# Patient Record
Sex: FEMALE | URBAN - METROPOLITAN AREA
[De-identification: names, ages, dates, MRNs, and addresses within clinical notes are randomized per-mention and may not be internally consistent; named-entity substitution may affect disease eponyms.]

---

## 2024-05-14 ENCOUNTER — APPOINTMENT (OUTPATIENT)
Dept: URBAN - METROPOLITAN AREA CLINIC 252 | Age: 39
Setting detail: DERMATOLOGY
End: 2024-05-14

## 2024-05-14 ENCOUNTER — APPOINTMENT (OUTPATIENT)
Dept: URBAN - METROPOLITAN AREA CLINIC 252 | Age: 39
Setting detail: DERMATOLOGY
End: 2024-05-15

## 2024-05-14 VITALS — WEIGHT: 135 LBS | HEIGHT: 65 IN

## 2024-05-14 DIAGNOSIS — L73.2 HIDRADENITIS SUPPURATIVA: ICD-10-CM

## 2024-05-14 PROCEDURE — 99202 OFFICE O/P NEW SF 15 MIN: CPT

## 2024-05-14 PROCEDURE — OTHER COUNSELING: OTHER

## 2024-05-14 ASSESSMENT — LOCATION SIMPLE DESCRIPTION DERM: LOCATION SIMPLE: GROIN

## 2024-05-14 ASSESSMENT — LOCATION ZONE DERM: LOCATION ZONE: TRUNK

## 2024-05-14 ASSESSMENT — LOCATION DETAILED DESCRIPTION DERM
LOCATION DETAILED: RIGHT INGUINAL CREASE
LOCATION DETAILED: LEFT INGUINAL CREASE

## 2024-05-14 NOTE — HPI: SKIN LESIONS
Is This A New Presentation, Or A Follow-Up?: Growths
Additional History: Has a history of hidradenitis suppurativa. No flare for 5 years, started with first pregnancy in 2002. Last preg was 2011. Only even been in groin creases. Used topical and oral antibiotic in past that were helpful in past. Her concern today is scarring. Never used spironolactone.

## 2024-05-14 NOTE — PROCEDURE: COUNSELING
Patient Specific Counseling (Will Not Stick From Patient To Patient): Discussed scarring is permanent unless surgically removed. May try lasers to help reduce pigmentation\\nIf pt flares advised pt to return for ilk injection. Discussed since pt hasn’t flared in five years maintenance antibiotic is not needed. Would consider maintenance spironolactone if pt flares regularly as pt flares during hormonal changes. \\nDiscussed that groin creases are high tension areas and would need to be seen by herbal surgeon to remove scarring. Discussed risk of infection or scarring/keloid with surgical removal. \\nDiscussed VBeam to reduce pink discoloration vs letting pigmentation fade with time.
Detail Level: Simple